# Patient Record
Sex: MALE
[De-identification: names, ages, dates, MRNs, and addresses within clinical notes are randomized per-mention and may not be internally consistent; named-entity substitution may affect disease eponyms.]

---

## 2019-06-13 ENCOUNTER — APPOINTMENT (OUTPATIENT)
Dept: OTOLARYNGOLOGY | Facility: CLINIC | Age: 49
End: 2019-06-13
Payer: COMMERCIAL

## 2019-06-13 DIAGNOSIS — H93.293 OTHER ABNORMAL AUDITORY PERCEPTIONS, BILATERAL: ICD-10-CM

## 2019-06-13 DIAGNOSIS — Z80.8 FAMILY HISTORY OF MALIGNANT NEOPLASM OF OTHER ORGANS OR SYSTEMS: ICD-10-CM

## 2019-06-13 DIAGNOSIS — Z78.9 OTHER SPECIFIED HEALTH STATUS: ICD-10-CM

## 2019-06-13 DIAGNOSIS — Z87.19 PERSONAL HISTORY OF OTHER DISEASES OF THE DIGESTIVE SYSTEM: ICD-10-CM

## 2019-06-13 DIAGNOSIS — Z80.3 FAMILY HISTORY OF MALIGNANT NEOPLASM OF BREAST: ICD-10-CM

## 2019-06-13 DIAGNOSIS — H61.23 IMPACTED CERUMEN, BILATERAL: ICD-10-CM

## 2019-06-13 PROBLEM — Z00.00 ENCOUNTER FOR PREVENTIVE HEALTH EXAMINATION: Status: ACTIVE | Noted: 2019-06-13

## 2019-06-13 PROCEDURE — 99203 OFFICE O/P NEW LOW 30 MIN: CPT | Mod: 25

## 2019-06-13 PROCEDURE — 69210 REMOVE IMPACTED EAR WAX UNI: CPT

## 2019-06-13 NOTE — ASSESSMENT
[FreeTextEntry1] : He had bilateral cement impaction. He felt better once the wax was removed. There was a little bit of irritation of the left ear canal. This was treated with powder\par \par PLAN\par \par -findings and management options discussed in detail with the patient. \par -good aural hygiene and dry ear precautions\par -avoid using cotton swabs in the ears\par -wax removal drops as needed. \par -noise precautions\par -He declined an audiogram to check his hearing and rule out hearing loss. I asked him to return if he has difference in the hearing between the ears\par -He will followup in 6 months to check his ears. He will return earlier if needed.\par

## 2019-06-13 NOTE — HISTORY OF PRESENT ILLNESS
[de-identified] : ANGELA VIZCAINO is a 49 year patient seen in consultation for a 3-4 day history of left-sided hearing loss and abnormal auditory perception. He was referred by Dr. Romeo. . He had a little bit of discomfort but that improved. He denies otalgia, otorrhea, tinnitus, or dizziness. He denies a history of hearing loss in the past.  he did have something similar which was due to cerumen impaction\par   \par History of recurrent ear infections-  no\par Prior ear surgery-no\par History of otologic trauma-  no\par Noise exposure-no\par Family history of hearing loss-  no

## 2019-06-13 NOTE — CONSULT LETTER
[Dear  ___] : Dear  [unfilled], [Consult Letter:] : I had the pleasure of evaluating your patient, [unfilled]. [Please see my note below.] : Please see my note below. [Consult Closing:] : Thank you very much for allowing me to participate in the care of this patient.  If you have any questions, please do not hesitate to contact me. [Sincerely,] : Sincerely, [FreeTextEntry3] : Maritza Mehta MD\par

## 2019-06-17 RX ORDER — CIPROFLOXACIN AND DEXAMETHASONE 3; 1 MG/ML; MG/ML
0.3-0.1 SUSPENSION/ DROPS AURICULAR (OTIC) TWICE DAILY
Qty: 1 | Refills: 1 | Status: ACTIVE | COMMUNITY
Start: 2019-06-17 | End: 1900-01-01